# Patient Record
Sex: FEMALE | Race: ASIAN | NOT HISPANIC OR LATINO
[De-identification: names, ages, dates, MRNs, and addresses within clinical notes are randomized per-mention and may not be internally consistent; named-entity substitution may affect disease eponyms.]

---
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
## 2022-06-10 ENCOUNTER — APPOINTMENT (OUTPATIENT)
Dept: INTERNAL MEDICINE | Facility: CLINIC | Age: 22
End: 2022-06-10

## 2022-07-07 ENCOUNTER — APPOINTMENT (OUTPATIENT)
Dept: SURGERY | Facility: CLINIC | Age: 22
End: 2022-07-07

## 2022-07-07 VITALS
DIASTOLIC BLOOD PRESSURE: 73 MMHG | TEMPERATURE: 97.9 F | OXYGEN SATURATION: 99 % | BODY MASS INDEX: 20.49 KG/M2 | SYSTOLIC BLOOD PRESSURE: 109 MMHG | WEIGHT: 120 LBS | HEIGHT: 64 IN | HEART RATE: 60 BPM

## 2022-07-07 DIAGNOSIS — Z78.9 OTHER SPECIFIED HEALTH STATUS: ICD-10-CM

## 2022-07-07 PROCEDURE — 99212 OFFICE O/P EST SF 10 MIN: CPT

## 2022-07-07 NOTE — HISTORY OF PRESENT ILLNESS
[FreeTextEntry1] : s/p excision of right breast mass 7/12/21. 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 Pt is here for a followup exam . She has had a breast sonogram. She denies any new masses, breast pain or nipple discharge.

## 2022-07-07 NOTE — PAST MEDICAL HISTORY
[Menarche Age ____] : age at menarche was [unfilled] [Definite ___ (Date)] : the last menstrual period was [unfilled] [Regular Cycle Intervals] : have been regular [History of Hormone Replacement Treatment] : has no history of hormone replacement treatment [FreeTextEntry5] : N/A [FreeTextEntry6] : N/A [FreeTextEntry7] : N/A [FreeTextEntry8] : N/A

## 2022-07-07 NOTE — PHYSICAL EXAM
[Normocephalic] : normocephalic [Atraumatic] : atraumatic [EOMI] : extra ocular movement intact [PERRL] : pupils equal, round and reactive to light [Sclera nonicteric] : sclera nonicteric [Supple] : supple [No Supraclavicular Adenopathy] : no supraclavicular adenopathy [No Cervical Adenopathy] : no cervical adenopathy [Clear to Auscultation Bilat] : clear to auscultation bilaterally [Normal Sinus Rhythm] : normal sinus rhythm [Examined in the supine and seated position] : examined in the supine and seated position [Symmetrical] : symmetrical [No dominant masses] : no dominant masses in right breast  [No dominant masses] : no dominant masses left breast [No Nipple Retraction] : no left nipple retraction [No Nipple Discharge] : no left nipple discharge [No Axillary Lymphadenopathy] : no left axillary lymphadenopathy [Soft] : abdomen soft [Normal Bowel Sounds] : normal bowel sounds  [No Edema] : no edema [No Rashes] : no rashes [No Ulceration] : no ulceration [de-identified] : well developed white female in no distress

## 2022-07-07 NOTE — ASSESSMENT
[FreeTextEntry1] : Sonogram report of 7/5/22 reviewed, ie multiple breast nodules with one new left breast nodule.\par Plan recommend sonograms every six months for two years till no changes than every year with exams at the same time

## 2022-12-26 ENCOUNTER — NON-APPOINTMENT (OUTPATIENT)
Age: 22
End: 2022-12-26

## 2023-01-05 ENCOUNTER — APPOINTMENT (OUTPATIENT)
Dept: SURGERY | Facility: CLINIC | Age: 23
End: 2023-01-05
Payer: COMMERCIAL

## 2023-01-05 VITALS — TEMPERATURE: 97.1 F

## 2023-01-05 PROCEDURE — 99212 OFFICE O/P EST SF 10 MIN: CPT

## 2023-01-05 NOTE — HISTORY OF PRESENT ILLNESS
[FreeTextEntry1] : 23 yo  Female presents s/p Breast US for follow up evaluation.  Patient denies pain, new masses, nipple inversion or drainage.\par \par Breast US 01/02/2023: Bilateral fluctuating benign appearing nodules, BI-RADS 3, Rec f/u US in 6 mo\par \par Interval Hx: s/p excision of right breast mass 7/12/21. 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 Pt is here for a followup exam . She has had a breast sonogram. She denies any new masses, breast pain or nipple discharge.

## 2023-06-27 ENCOUNTER — APPOINTMENT (OUTPATIENT)
Dept: INTERNAL MEDICINE | Facility: CLINIC | Age: 23
End: 2023-06-27
Payer: COMMERCIAL

## 2023-06-27 VITALS
OXYGEN SATURATION: 99 % | HEART RATE: 73 BPM | DIASTOLIC BLOOD PRESSURE: 80 MMHG | WEIGHT: 127 LBS | SYSTOLIC BLOOD PRESSURE: 122 MMHG

## 2023-06-27 DIAGNOSIS — Z00.00 ENCOUNTER FOR GENERAL ADULT MEDICAL EXAMINATION W/OUT ABNORMAL FINDINGS: ICD-10-CM

## 2023-06-27 PROCEDURE — 99385 PREV VISIT NEW AGE 18-39: CPT | Mod: 25

## 2023-06-27 PROCEDURE — 36415 COLL VENOUS BLD VENIPUNCTURE: CPT

## 2023-06-27 NOTE — PLAN
[FreeTextEntry1] : \par HCM )  follow w/ dental, opthalmology, GYN as recommended\par vaccination up to date as per pt \par healthy and safe lifestyle discussed with pt\par Lab ( venipuncture ) done today at this office\par \par Multiple breast masses, likely benign fibroadenomas, follow up US scheduled in july\par Breast clinic referral given to pt after US done\par \par

## 2023-06-27 NOTE — HISTORY OF PRESENT ILLNESS
[FreeTextEntry1] : new patient evaluation [de-identified] : pt has been doing well without any acute illness or injury\par PSH breast lump removal 2021\par Allergy none\par PMH breast lump ( fibroadenoma Rt )\par

## 2023-06-27 NOTE — PHYSICAL EXAM
[No Acute Distress] : no acute distress [Well Nourished] : well nourished [Well Developed] : well developed [Well-Appearing] : well-appearing [Normal Sclera/Conjunctiva] : normal sclera/conjunctiva [PERRL] : pupils equal round and reactive to light [EOMI] : extraocular movements intact [Normal Outer Ear/Nose] : the outer ears and nose were normal in appearance [No JVD] : no jugular venous distention [No Lymphadenopathy] : no lymphadenopathy [Supple] : supple [Thyroid Normal, No Nodules] : the thyroid was normal and there were no nodules present [No Respiratory Distress] : no respiratory distress  [No Accessory Muscle Use] : no accessory muscle use [Clear to Auscultation] : lungs were clear to auscultation bilaterally [Normal Rate] : normal rate  [Regular Rhythm] : with a regular rhythm [Normal S1, S2] : normal S1 and S2 [No Murmur] : no murmur heard [No Edema] : there was no peripheral edema [No Extremity Clubbing/Cyanosis] : no extremity clubbing/cyanosis [Soft] : abdomen soft [Non Tender] : non-tender [Non-distended] : non-distended [No Masses] : no abdominal mass palpated [Normal Anterior Cervical Nodes] : no anterior cervical lymphadenopathy [Grossly Normal Strength/Tone] : grossly normal strength/tone [Coordination Grossly Intact] : coordination grossly intact [Normal Gait] : normal gait [Normal Affect] : the affect was normal [Normal Insight/Judgement] : insight and judgment were intact [Normal Appearance] : normal in appearance [No Joint Swelling] : no joint swelling [Alert and Oriented x3] : oriented to person, place, and time [de-identified] : 2 mass palpable on Rt breast, one mass on left breast

## 2023-06-27 NOTE — HEALTH RISK ASSESSMENT
[Very Good] : ~his/her~  mood as very good [Yes] : Yes [2 - 4 times a month (2 pts)] : 2-4 times a month (2 points) [1 or 2 (0 pts)] : 1 or 2 (0 points) [No] : In the past 12 months have you used drugs other than those required for medical reasons? No [No falls in past year] : Patient reported no falls in the past year [0] : 2) Feeling down, depressed, or hopeless: Not at all (0) [PHQ-2 Negative - No further assessment needed] : PHQ-2 Negative - No further assessment needed [HIV test declined] : HIV test declined [Hepatitis C test declined] : Hepatitis C test declined [None] : None [With Family] : lives with family [Student] : student [Single] : single [Feels Safe at Home] : Feels safe at home [Fully functional (bathing, dressing, toileting, transferring, walking, feeding)] : Fully functional (bathing, dressing, toileting, transferring, walking, feeding) [Fully functional (using the telephone, shopping, preparing meals, housekeeping, doing laundry, using] : Fully functional and needs no help or supervision to perform IADLs (using the telephone, shopping, preparing meals, housekeeping, doing laundry, using transportation, managing medications and managing finances) [Reports normal functional visual acuity (ie: able to read med bottle)] : Reports normal functional visual acuity [Seat Belt] :  uses seat belt [Never] : Never [de-identified] : yoga, walking  [de-identified] : regular [Change in mental status noted] : No change in mental status noted [Language] : denies difficulty with language [Behavior] : denies difficulty with behavior [Sexually Active] : not sexually active [Reports changes in hearing] : Reports no changes in hearing [Reports changes in vision] : Reports no changes in vision [Reports changes in dental health] : Reports no changes in dental health

## 2023-06-29 ENCOUNTER — NON-APPOINTMENT (OUTPATIENT)
Age: 23
End: 2023-06-29

## 2023-06-29 LAB
ALBUMIN SERPL ELPH-MCNC: 5.1 G/DL
ALP BLD-CCNC: 82 U/L
ALT SERPL-CCNC: 11 U/L
ANION GAP SERPL CALC-SCNC: 16 MMOL/L
AST SERPL-CCNC: 19 U/L
BILIRUB SERPL-MCNC: 0.3 MG/DL
BUN SERPL-MCNC: 9 MG/DL
CALCIUM SERPL-MCNC: 9.9 MG/DL
CHLORIDE SERPL-SCNC: 104 MMOL/L
CHOLEST SERPL-MCNC: 164 MG/DL
CO2 SERPL-SCNC: 21 MMOL/L
CREAT SERPL-MCNC: 0.58 MG/DL
EGFR: 132 ML/MIN/1.73M2
GLUCOSE SERPL-MCNC: 100 MG/DL
HCT VFR BLD CALC: 42.9 %
HDLC SERPL-MCNC: 30 MG/DL
HGB BLD-MCNC: 13.2 G/DL
LDLC SERPL CALC-MCNC: 54 MG/DL
MCHC RBC-ENTMCNC: 27.6 PG
MCHC RBC-ENTMCNC: 30.8 GM/DL
MCV RBC AUTO: 89.6 FL
NONHDLC SERPL-MCNC: 134 MG/DL
PLATELET # BLD AUTO: 349 K/UL
POTASSIUM SERPL-SCNC: 4.2 MMOL/L
PROT SERPL-MCNC: 8.3 G/DL
RBC # BLD: 4.79 M/UL
RBC # FLD: 12.4 %
SODIUM SERPL-SCNC: 141 MMOL/L
TRIGL SERPL-MCNC: 400 MG/DL
TSH SERPL-ACNC: 2.99 UIU/ML
WBC # FLD AUTO: 6.25 K/UL

## 2023-07-06 ENCOUNTER — APPOINTMENT (OUTPATIENT)
Dept: SURGERY | Facility: CLINIC | Age: 23
End: 2023-07-06
Payer: COMMERCIAL

## 2023-07-06 PROCEDURE — 99212 OFFICE O/P EST SF 10 MIN: CPT

## 2023-07-06 NOTE — HISTORY OF PRESENT ILLNESS
[FreeTextEntry1] : s/p excision of right breast mass 7/12/21. 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 Pt is here for a followup exam . She has had a breast sonogram. She denies any new masses, breast pain or nipple discharge.\par 7/5/23 followup exam. Pt had a repeat sonogram that showed three of the right breast lesions were bigger in size.

## 2023-07-06 NOTE — PHYSICAL EXAM
[Normocephalic] : normocephalic [Atraumatic] : atraumatic [EOMI] : extra ocular movement intact [PERRL] : pupils equal, round and reactive to light [Sclera nonicteric] : sclera nonicteric [Supple] : supple [No Supraclavicular Adenopathy] : no supraclavicular adenopathy [No Cervical Adenopathy] : no cervical adenopathy [Clear to Auscultation Bilat] : clear to auscultation bilaterally [Normal Sinus Rhythm] : normal sinus rhythm [Examined in the supine and seated position] : examined in the supine and seated position [Symmetrical] : symmetrical [No dominant masses] : no dominant masses left breast [No Nipple Retraction] : no left nipple retraction [No Nipple Discharge] : no left nipple discharge [No Axillary Lymphadenopathy] : no left axillary lymphadenopathy [de-identified] : well developed female in no acute distress

## 2023-07-06 NOTE — ASSESSMENT
[FreeTextEntry1] : I have discussed with pt and mother that if any of these masses continues to grow that they should be removed.\par PLAN \par    repeat sonogram and exam in six months

## 2023-11-21 ENCOUNTER — APPOINTMENT (OUTPATIENT)
Dept: INTERNAL MEDICINE | Facility: CLINIC | Age: 23
End: 2023-11-21

## 2023-11-23 ENCOUNTER — NON-APPOINTMENT (OUTPATIENT)
Age: 23
End: 2023-11-23

## 2023-12-28 ENCOUNTER — APPOINTMENT (OUTPATIENT)
Dept: INTERNAL MEDICINE | Facility: CLINIC | Age: 23
End: 2023-12-28
Payer: COMMERCIAL

## 2023-12-28 VITALS
DIASTOLIC BLOOD PRESSURE: 66 MMHG | WEIGHT: 125 LBS | BODY MASS INDEX: 21.34 KG/M2 | OXYGEN SATURATION: 98 % | HEIGHT: 64 IN | SYSTOLIC BLOOD PRESSURE: 104 MMHG | HEART RATE: 86 BPM

## 2023-12-28 DIAGNOSIS — R73.01 IMPAIRED FASTING GLUCOSE: ICD-10-CM

## 2023-12-28 DIAGNOSIS — E78.1 PURE HYPERGLYCERIDEMIA: ICD-10-CM

## 2023-12-28 PROCEDURE — 99213 OFFICE O/P EST LOW 20 MIN: CPT | Mod: 25

## 2023-12-28 PROCEDURE — 36415 COLL VENOUS BLD VENIPUNCTURE: CPT

## 2023-12-28 NOTE — HISTORY OF PRESENT ILLNESS
[FreeTextEntry1] : hyperlipidemia [de-identified] : pt found to have high TG, started fish oil supplement. pt asymptomatic pt to follow with Dr Messina in 1/2024

## 2023-12-28 NOTE — PLAN
[FreeTextEntry1] :  repeat fasting lipid today consider starting vascepa if persistently elevated TG on OTC fish oil check Hb A1c R/O preDM ( last fasting glucose 100 )

## 2023-12-28 NOTE — PHYSICAL EXAM
[No Acute Distress] : no acute distress [Well-Appearing] : well-appearing [Supple] : supple [No Respiratory Distress] : no respiratory distress  [No Accessory Muscle Use] : no accessory muscle use [Clear to Auscultation] : lungs were clear to auscultation bilaterally [Normal Rate] : normal rate  [Regular Rhythm] : with a regular rhythm [Normal S1, S2] : normal S1 and S2 [No Edema] : there was no peripheral edema [No Extremity Clubbing/Cyanosis] : no extremity clubbing/cyanosis [Grossly Normal Strength/Tone] : grossly normal strength/tone [Normal Gait] : normal gait [Normal Affect] : the affect was normal [Alert and Oriented x3] : oriented to person, place, and time 2 = difficulty swallowing foods

## 2024-01-02 LAB
CHOLEST SERPL-MCNC: 141 MG/DL
ESTIMATED AVERAGE GLUCOSE: 105 MG/DL
HBA1C MFR BLD HPLC: 5.3 %
HDLC SERPL-MCNC: 24 MG/DL
LDLC SERPL CALC-MCNC: 66 MG/DL
NONHDLC SERPL-MCNC: 117 MG/DL
TRIGL SERPL-MCNC: 322 MG/DL

## 2024-01-03 ENCOUNTER — APPOINTMENT (OUTPATIENT)
Dept: SURGICAL ONCOLOGY | Facility: CLINIC | Age: 24
End: 2024-01-03
Payer: COMMERCIAL

## 2024-01-03 ENCOUNTER — NON-APPOINTMENT (OUTPATIENT)
Age: 24
End: 2024-01-03

## 2024-01-03 VITALS
BODY MASS INDEX: 21.34 KG/M2 | DIASTOLIC BLOOD PRESSURE: 81 MMHG | WEIGHT: 125 LBS | SYSTOLIC BLOOD PRESSURE: 115 MMHG | HEART RATE: 64 BPM | HEIGHT: 64 IN | OXYGEN SATURATION: 99 % | RESPIRATION RATE: 17 BRPM

## 2024-01-03 DIAGNOSIS — Z80.0 FAMILY HISTORY OF MALIGNANT NEOPLASM OF DIGESTIVE ORGANS: ICD-10-CM

## 2024-01-03 DIAGNOSIS — Z80.8 FAMILY HISTORY OF MALIGNANT NEOPLASM OF OTHER ORGANS OR SYSTEMS: ICD-10-CM

## 2024-01-03 PROCEDURE — 99204 OFFICE O/P NEW MOD 45 MIN: CPT

## 2024-01-03 PROCEDURE — 99214 OFFICE O/P EST MOD 30 MIN: CPT

## 2024-01-03 NOTE — PHYSICAL EXAM
[Normocephalic] : normocephalic [Atraumatic] : atraumatic [EOMI] : extra ocular movement intact [PERRL] : pupils equal, round and reactive to light [Sclera nonicteric] : sclera nonicteric [Supple] : supple [No Supraclavicular Adenopathy] : no supraclavicular adenopathy [No Cervical Adenopathy] : no cervical adenopathy [Examined in the supine and seated position] : examined in the supine and seated position [Bra Size: ___] : Bra Size: [unfilled] [No dominant masses] : no dominant masses in right breast  [No dominant masses] : no dominant masses left breast [No Nipple Retraction] : no left nipple retraction [No Nipple Discharge] : no left nipple discharge [No Axillary Lymphadenopathy] : no left axillary lymphadenopathy [No Edema] : no edema [No Rashes] : no rashes [No Ulceration] : no ulceration [de-identified] : non-labored respirations

## 2024-01-03 NOTE — PAST MEDICAL HISTORY
[Menstruating] : The patient is menstruating [Menarche Age ____] : age at menarche was [unfilled] [History of Hormone Replacement Treatment] : has no history of hormone replacement treatment [Approximately ___] : the LMP was approximately [unfilled] [Regular Cycle Intervals] : have been regular [Total Preg ___] : G[unfilled] [FreeTextEntry6] : None [FreeTextEntry7] : 1 month

## 2024-01-03 NOTE — DATA REVIEWED
[FreeTextEntry1] : 5/20/2021 B/L Baseline DM 5/20/2021 B/L US 7/12/2023 R excisional biopsy 7/5/2022 B/L US 1/2/2023 B/L US 7/1/2023 B/L US

## 2024-01-03 NOTE — HISTORY OF PRESENT ILLNESS
[FreeTextEntry1] : This is a 23 year-old F referred by Dr. Audi Dalal for evaluation of BIRADS 3 breast imaging/bilateral breast nodules, here for an initial consultation.  She is accompanied by her mother Shade Bush.  The patient reports that she noticed bilateral breast masses about 5 years ago.  She had a larger mass on the right than the left.  She is unsure when she started breast imaging, but the right breast mass was shown to increase, and she finally went for excision in  where it was found to be a fibroadenoma versus benign phyllodes.  Since then, she has noticed other breast masses in the right side, one of them also now growing.  She still feels the left-sided mass, but does not think has gotten any bigger.  Recent imagin2021 B/L Baseline DM ([facility]) revealed extremely dense breasts -R upper central 4.7 cm nodule (palp) with sono correlate, possible fibroadenoma--> f/u US in 6 months -L neg -BR3  2021 B/L US ([facility]) -R 12:00 N3 (palp) 4.6 cm hypoechoic nodule--> f/u R US in 6 months -R 2:00 N2 1.2 cm hypoechoic nodule -R 6:00 N4 1.2 cm hypoechoic nodule -L 5:00 N5 1.2 cm hypoechoic nodule (palp)--> f/u L US in 6 months -L 4:00 N4 6 mm hypoechoic nodule -L 9:00 N6 1.8 cm hypoechoic nodule -L11:00 N5 1.1 cm hypoechoic nodule -L axillary LN 2.7 cm with cortical thickening (likely reactive 2/2 Covid vaccine)--> f/u US in 6 months -L axillary LN 1 cm (likely reactive 2/2 Covid vaccine)--> f/u US in 6 months -BR3  2023 R excisional biopsy (mass 12:00) Dr. Sincere Rice -R 12:00: benign fibroepithelial lesion, favor fibroadenoma (focal changes suggestive of benign phyllodes tumor).  Slide review at [facility] notes that overall findings are that of fibroadenoma.  2022 B/L US ([facility]) -R 2:00 N2 1.2 cm mass, unchanged -R 6:00 N4 1.1 cm mass, unchanged -L 1:00 N2 1.6 cm mass, new -L 4:00 N4 9 mm mass minimally increased in size -L 5:00 N5 1.2 cm mass, unchanged -L 9:00 N6 1.7 cm mass, unchanged -L 11:00 N5 1.2 cm mass, unchanged -L axillary LNs within normal limits -BR3--> f/u b/l US in 6 months to re assess all findings  2023 B/L US ([facility]) -R 1:00 N9 1.6 cm nodule, new -R 2:00 N2 9 mm nodule, slightly smaller -R 6:00 N4 8 mm nodule, decreased -R 7:00 N8 1.1 cm nodule, new -R 9:00 N8 9 mm nodule, new -R 11:00 N4 2 cm nodule, new -L 1:00 N2 1.7 cm nodule, stable -L 4:00 N4 1.2 cm nodule, slightly smaller -L 5:00 N5 1,9 cm nodule, slightly larger -L 9:00 N6 1.2 cm nodule, decreased -L 11:00 N5 1.1 cm nodule, stable -BR3--> f/u b/l US in 6 months to reassess all findings  2023 B/L US ([facility]) -R 1:00 N9 1.9 cm nodule, increased -R 2:00 N2 9 mm nodule, stable -R 6:00 N4 1.3 cm nodule, increased -R 7:00 N8 1.1 cm nodule, stable -R 9:00 N8 8 mm nodule, stable -R 11:00 N4 2.5 cm nodule, increased -L 1:00 N2, 1.5 cm nodule, stable -L 4:00 N4, 1.3 cm nodule, stable -L 5:00 N5 1.6 cm nodule, stable -L 9:00 N6 1.4 cm nodule, stable -L 11:00 N5 1 cm nodule, stable -BR3--> f/u b/l US in 6 months to reassess all findings  2023 B/L US -R 1:00 N9 2.4 cm hypoechoic nodule, increased -R 2:00 N2 9 mm hypoechoic nodule, stable -R 7:00 N8 1.2 cm hypoechoic nodule, stable -R 6:00 N41.3 Centimeter hypoechoic nodule, stable -R 9:00 N8 8 mm hypoechoic nodule, stable -R 11:00 N4 3.0 cm hypoechoic nodule, increased in size -L 1:00 N2 1.7 cm hypoechoic nodule, stable -L 4:00 N4 1.3 cm hypoechoic nodule, stable -L 5:00 N5 1.6 cm hypoechoic nodule, stable -L 9:00 N6 1.5 Centimeter hypoechoic nodule, stable -L 11:00 N5 1.2 Centimeter hypoechoic nodule, stable -BR3  PMH: HLD PSH: Right breast excisional biopsy  Meds: Fish oil ALL: Denies SH: No tobacco.  3-4 EtOH/month FH: Paternal aunt breast cancer age 50.  Paternal grandmother colon cancer age 60s.  Paternal uncle rectal cancer age 50s.  Paternal aunt thyroid cancer age 50s. GYN: Menarche 15.  LMP approximately 1 month ago, regular.  G0, P0.  OCP x 1 month, not currently.  Fertility none.  HRT none.

## 2024-01-03 NOTE — ASSESSMENT
[FreeTextEntry1] : This is a 23 year-old F referred by Dr. Audi Dalal for evaluation of BIRADS 3 breast imaging/bilateral breast nodules, here for an initial consultation.  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  Fibroadenomas are the most common benign tumor in the breast, accounting for one-half of all breast biopsies We discussed indications for observation vs. excision of fibroadenomas, and the role of the "triple test": if a breast mass is benign on radiologic appearance, clinical exam and needle biopsy, it is safe to observe these lesions with repeat imaging q6 months x2 years--if the lesion remains unchanged or regresses, at that time the patient can be safely returned to routine breast care. If the lesion grows or changes in configuration, excision is recommended, as it also is for cases where there is discordance between these three characteristics.   We discussed that fibroadenomas are not signs of unusual breast activity or breast cancer risk.  However, given the large size, a small proportion of fibroadenomas on core biopsy are upgraded to a phyllodes tumor on excision. Phyllodes tumors are uncommon fibroepithelial breast tumors that are further classified as benign, borderline, or malignant.  Benign and borderline phyllodes tumors rarely recur following wide excision. Malignant phyllodes tumors can metastasize distantly.   One cm margins for borderline or malignant phyllodes tumors are preferable, and narrower margins are acceptable for benign phyllodes tumors.   Preoperative, intraoperative, and postoperative considerations, intraoperative anesthetic management, and what she can expect in postoperative period were reviewed. Risks, benefits, and alternatives to proposed surgical procedure were reviewed and all questions were answered to her satisfaction.     PLAN: -Right breast excisional biopsy x 2 (palpable) -Will continue to follow other breast masses B/L US 6/2024 -Pt is going back to college in 2 weeks--can try to plan for surgery now but if not able to schedule, will have to delay until she returns in May. This is probably ok given BR3 lesions. Will reimage prior to surgery if scheduled in May

## 2024-01-03 NOTE — CONSULT LETTER
[Dear  ___] : Dear  [unfilled], [Courtesy Letter:] : I had the pleasure of seeing your patient, [unfilled], in my office today. [Please see my note below.] : Please see my note below. [Consult Closing:] : Thank you very much for allowing me to participate in the care of this patient.  If you have any questions, please do not hesitate to contact me. [Sincerely,] : Sincerely, [Consult Letter:] : I had the pleasure of evaluating your patient, [unfilled]. [FreeTextEntry2] : Audi Dalal MD [FreeTextEntry3] : Katie Messina MD Breast Surgeon Division of Surgical Oncology Department of Surgery [address] [place] [address] Tel: [phone] Fax: [phone] Email: [email or web address]

## 2024-01-04 ENCOUNTER — APPOINTMENT (OUTPATIENT)
Dept: SURGERY | Facility: CLINIC | Age: 24
End: 2024-01-04

## 2024-01-05 ENCOUNTER — OUTPATIENT (OUTPATIENT)
Dept: OUTPATIENT SERVICES | Facility: HOSPITAL | Age: 24
LOS: 1 days | End: 2024-01-05

## 2024-01-05 VITALS
WEIGHT: 126.1 LBS | HEIGHT: 63.75 IN | HEART RATE: 68 BPM | TEMPERATURE: 98 F | SYSTOLIC BLOOD PRESSURE: 119 MMHG | RESPIRATION RATE: 16 BRPM | DIASTOLIC BLOOD PRESSURE: 84 MMHG | OXYGEN SATURATION: 98 %

## 2024-01-05 DIAGNOSIS — N63.10 UNSPECIFIED LUMP IN THE RIGHT BREAST, UNSPECIFIED QUADRANT: ICD-10-CM

## 2024-01-05 DIAGNOSIS — Z98.890 OTHER SPECIFIED POSTPROCEDURAL STATES: Chronic | ICD-10-CM

## 2024-01-05 DIAGNOSIS — N60.09 SOLITARY CYST OF UNSPECIFIED BREAST: ICD-10-CM

## 2024-01-05 LAB
HCG SERPL-ACNC: <1 MIU/ML — SIGNIFICANT CHANGE UP
HCG SERPL-ACNC: <1 MIU/ML — SIGNIFICANT CHANGE UP
HCT VFR BLD CALC: 38.6 % — SIGNIFICANT CHANGE UP (ref 34.5–45)
HCT VFR BLD CALC: 38.6 % — SIGNIFICANT CHANGE UP (ref 34.5–45)
HGB BLD-MCNC: 12.6 G/DL — SIGNIFICANT CHANGE UP (ref 11.5–15.5)
HGB BLD-MCNC: 12.6 G/DL — SIGNIFICANT CHANGE UP (ref 11.5–15.5)
MCHC RBC-ENTMCNC: 27.3 PG — SIGNIFICANT CHANGE UP (ref 27–34)
MCHC RBC-ENTMCNC: 27.3 PG — SIGNIFICANT CHANGE UP (ref 27–34)
MCHC RBC-ENTMCNC: 32.6 GM/DL — SIGNIFICANT CHANGE UP (ref 32–36)
MCHC RBC-ENTMCNC: 32.6 GM/DL — SIGNIFICANT CHANGE UP (ref 32–36)
MCV RBC AUTO: 83.7 FL — SIGNIFICANT CHANGE UP (ref 80–100)
MCV RBC AUTO: 83.7 FL — SIGNIFICANT CHANGE UP (ref 80–100)
NRBC # BLD: 0 /100 WBCS — SIGNIFICANT CHANGE UP (ref 0–0)
NRBC # BLD: 0 /100 WBCS — SIGNIFICANT CHANGE UP (ref 0–0)
NRBC # FLD: 0 K/UL — SIGNIFICANT CHANGE UP (ref 0–0)
NRBC # FLD: 0 K/UL — SIGNIFICANT CHANGE UP (ref 0–0)
PLATELET # BLD AUTO: 349 K/UL — SIGNIFICANT CHANGE UP (ref 150–400)
PLATELET # BLD AUTO: 349 K/UL — SIGNIFICANT CHANGE UP (ref 150–400)
RBC # BLD: 4.61 M/UL — SIGNIFICANT CHANGE UP (ref 3.8–5.2)
RBC # BLD: 4.61 M/UL — SIGNIFICANT CHANGE UP (ref 3.8–5.2)
RBC # FLD: 12.3 % — SIGNIFICANT CHANGE UP (ref 10.3–14.5)
RBC # FLD: 12.3 % — SIGNIFICANT CHANGE UP (ref 10.3–14.5)
WBC # BLD: 5.91 K/UL — SIGNIFICANT CHANGE UP (ref 3.8–10.5)
WBC # BLD: 5.91 K/UL — SIGNIFICANT CHANGE UP (ref 3.8–10.5)
WBC # FLD AUTO: 5.91 K/UL — SIGNIFICANT CHANGE UP (ref 3.8–10.5)
WBC # FLD AUTO: 5.91 K/UL — SIGNIFICANT CHANGE UP (ref 3.8–10.5)

## 2024-01-05 NOTE — H&P PST ADULT - HISTORY OF PRESENT ILLNESS
22 y/o healthy female with c/o of right breast lump x2 scheduled for right breast excisional biopsy x 2 (palpable) with Dr. Messina  24 y/o healthy female with c/o of right breast lump x2 scheduled for right breast excisional biopsy x 2 (palpable) with Dr. Messina  22 y/o healthy female with c/o of right breast lump x2 scheduled for right breast excisional biopsy x 2 (palpable) with Dr. Shah

## 2024-01-05 NOTE — H&P PST ADULT - ASSESSMENT
22 y/o healthy female with c/o of right breast lump x2 scheduled for right breast excisional biopsy x 2 (palpable) with Dr. Shah 24 y/o healthy female with c/o of right breast lump x2 scheduled for right breast excisional biopsy x 2 (palpable) with Dr. Shah

## 2024-01-05 NOTE — H&P PST ADULT - NEGATIVE CARDIOVASCULAR SYMPTOMS
no chest pain/no dyspnea on exertion/no peripheral edema no chest pain/no palpitations/no dyspnea on exertion/no peripheral edema

## 2024-01-05 NOTE — H&P PST ADULT - PROBLEM SELECTOR PLAN 1
-Scheduled for right breast excisional biopsy x 2 (palpable) with Dr. Messina on 1/11/2024.  -Verbal and written pre-op instructions provided to patient. Patient verbalized understanding and will call surgeons office for revised instructions if surgery is rescheduled.   -Pepcid for GI prophylaxis provided.   -patient given verbal and written instruction with teach back on chlorhexidine shampoo, and the patient verbalized understanding with return demonstration.   Urine pregnancy test DOS-Urine cup provided.

## 2024-01-10 ENCOUNTER — TRANSCRIPTION ENCOUNTER (OUTPATIENT)
Age: 24
End: 2024-01-10

## 2024-01-11 ENCOUNTER — TRANSCRIPTION ENCOUNTER (OUTPATIENT)
Age: 24
End: 2024-01-11

## 2024-01-11 ENCOUNTER — OUTPATIENT (OUTPATIENT)
Dept: OUTPATIENT SERVICES | Facility: HOSPITAL | Age: 24
LOS: 1 days | Discharge: HOME | End: 2024-01-11
Payer: COMMERCIAL

## 2024-01-11 ENCOUNTER — RESULT REVIEW (OUTPATIENT)
Age: 24
End: 2024-01-11

## 2024-01-11 ENCOUNTER — APPOINTMENT (OUTPATIENT)
Dept: SURGICAL ONCOLOGY | Facility: AMBULATORY SURGERY CENTER | Age: 24
End: 2024-01-11

## 2024-01-11 VITALS
DIASTOLIC BLOOD PRESSURE: 89 MMHG | SYSTOLIC BLOOD PRESSURE: 124 MMHG | HEIGHT: 63.75 IN | WEIGHT: 126.1 LBS | HEART RATE: 63 BPM | OXYGEN SATURATION: 98 % | TEMPERATURE: 97 F | RESPIRATION RATE: 20 BRPM

## 2024-01-11 VITALS
HEART RATE: 72 BPM | OXYGEN SATURATION: 97 % | RESPIRATION RATE: 18 BRPM | DIASTOLIC BLOOD PRESSURE: 75 MMHG | SYSTOLIC BLOOD PRESSURE: 118 MMHG

## 2024-01-11 DIAGNOSIS — N63.10 UNSPECIFIED LUMP IN THE RIGHT BREAST, UNSPECIFIED QUADRANT: ICD-10-CM

## 2024-01-11 DIAGNOSIS — Z98.890 OTHER SPECIFIED POSTPROCEDURAL STATES: Chronic | ICD-10-CM

## 2024-01-11 PROCEDURE — 88305 TISSUE EXAM BY PATHOLOGIST: CPT | Mod: 26

## 2024-01-11 PROCEDURE — 19120 REMOVAL OF BREAST LESION: CPT | Mod: RT

## 2024-01-11 RX ORDER — OMEGA-3 ACID ETHYL ESTERS 1 G
1 CAPSULE ORAL
Refills: 0 | DISCHARGE

## 2024-01-11 RX ORDER — IBUPROFEN 200 MG
1 TABLET ORAL
Qty: 0 | Refills: 0 | DISCHARGE

## 2024-01-11 RX ORDER — ACETAMINOPHEN 500 MG
1 TABLET ORAL
Qty: 0 | Refills: 0 | DISCHARGE

## 2024-01-11 NOTE — ASU DISCHARGE PLAN (ADULT/PEDIATRIC) - ASU DC SPECIAL INSTRUCTIONSFT
Pre-printed instructions at bedside. Call Dr. Messina's office to schedule a follow up appointment for 7-10 days from now (can be virtual). Take tylenol and motrin each every 6 hours alternating in a staggered way so you have either tylenol or motrin every 3 hours. Wear your own sports bra as much as is comfortable. Use ice packs for swelling and pain control. Please see printed bedside instructions.
Self

## 2024-01-11 NOTE — ASU DISCHARGE PLAN (ADULT/PEDIATRIC) - PROVIDER TOKENS
PROVIDER:[TOKEN:[[id number]],FOLLOWUP:[1 week]] PROVIDER:[TOKEN:[[id number]],FOLLOWUP:[1 week]]

## 2024-01-11 NOTE — ASU PREOPERATIVE ASSESSMENT, ADULT ([facility] ONLY) - FALL HARM RISK - UNIVERSAL INTERVENTIONS
Bed in lowest position, wheels locked, appropriate side rails in place/Call bell, personal items and telephone in reach/Instruct patient to call for assistance before getting out of bed or chair/Non-slip footwear when patient is out of bed/[place] to call system/Physically safe environment - no spills, clutter or unnecessary equipment/Purposeful Proactive Rounding/Room/bathroom lighting operational, light cord in reach Bed in lowest position, wheels locked, appropriate side rails in place/Call bell, personal items and telephone in reach/Instruct patient to call for assistance before getting out of bed or chair/Non-slip footwear when patient is out of bed/[place] to call system/Physically safe environment - no spills, clutter or unnecessary equipment/Purposeful Proactive Rounding/Room/bathroom lighting operational, light cord in reach

## 2024-01-11 NOTE — ASU DISCHARGE PLAN (ADULT/PEDIATRIC) - NS MD DC FALL RISK RISK
For information on Fall & Injury Prevention, visit: [email or web address] OR  [email or web address] OR  https://www.cdc.gov/steadi/patient.html For information on Fall & Injury Prevention, visit: [email or web address]://www.[facility]/news/fall-[email or web address]-[email or web address]-[email or web address]-mobility OR  https://www.[facility]/news/fall-[email or web address]-[email or web address]-to-avoid-injury OR  https://www.cdc.gov/steadi/patient.html

## 2024-01-11 NOTE — ASU DISCHARGE PLAN (ADULT/PEDIATRIC) - CARE PROVIDER_API CALL
Katie Messina)  Surgery  [address] [place] [id number]  Phone: [phone]  Fax: [phone]  Follow Up Time: 1 week   Katie Messina)  Surgery  [address] [place] [id number]  Phone: [phone]  Fax: [phone]  Follow Up Time: 1 week

## 2024-01-22 LAB — SURGICAL PATHOLOGY STUDY: SIGNIFICANT CHANGE UP

## 2024-02-12 NOTE — ASU DISCHARGE PLAN (ADULT/PEDIATRIC) - HISTORY OF COVID-19 VACCINATION
From: Janeth Fenton  To: Sidra Borges  Sent: 2/12/2024 1:56 PM CST  Subject: Illness     Janeth woke up with 103 fever. She is congested, sore throat, headache, achy, and no appetite. I took her for swabs just to make sure it wasn't covid or influenza. Considering she is still on Cipro, do you think we have anything else she should be checked for? It doesn't seem like strep.    Also, could she get a note for school for today and tomorrow please? I can't send her back until she is fever free for 24 hours so she isn't contagious. She has slept all day aside from our trip to [place]. I just don't want to take her out to contaminate the world if we don't have to.     Thank you!   Yes

## 2024-05-23 PROBLEM — N60.09 SOLITARY CYST OF UNSPECIFIED BREAST: Chronic | Status: ACTIVE | Noted: 2024-01-05

## 2024-07-01 PROBLEM — N63.10 BREAST MASS, RIGHT: Status: ACTIVE | Noted: 2021-07-20

## 2024-07-01 PROBLEM — N63.10 MASSES OF BOTH BREASTS: Status: ACTIVE | Noted: 2021-05-25

## 2024-07-03 ENCOUNTER — APPOINTMENT (OUTPATIENT)
Dept: SURGICAL ONCOLOGY | Facility: CLINIC | Age: 24
End: 2024-07-03
Payer: COMMERCIAL

## 2024-07-03 VITALS
RESPIRATION RATE: 17 BRPM | SYSTOLIC BLOOD PRESSURE: 118 MMHG | BODY MASS INDEX: 21.34 KG/M2 | OXYGEN SATURATION: 99 % | HEIGHT: 64 IN | HEART RATE: 61 BPM | DIASTOLIC BLOOD PRESSURE: 77 MMHG | WEIGHT: 125 LBS

## 2024-07-03 DIAGNOSIS — N63.20 UNSPECIFIED LUMP IN THE LEFT BREAST, UNSPECIFIED QUADRANT: ICD-10-CM

## 2024-07-03 DIAGNOSIS — N63.10 UNSPECIFIED LUMP IN THE RIGHT BREAST, UNSPECIFIED QUADRANT: ICD-10-CM

## 2024-07-03 DIAGNOSIS — N63.20 UNSPECIFIED LUMP IN THE RIGHT BREAST, UNSPECIFIED QUADRANT: ICD-10-CM

## 2024-07-03 PROCEDURE — 99214 OFFICE O/P EST MOD 30 MIN: CPT

## 2024-07-16 NOTE — PHYSICAL EXAM
[facility] Hospitalist Progress Note      SUBJECTIVE       Patient seen and examined on the past 1 month open loss of appetite on and off confused  Meant to assisted awaiting place   REVIEW OF SYSTEMS     Negative for all 10 systems except as per subjective    OBJECTIVE   Awake pleasant    I/O'S       Intake/Output Summary (Last 24 hours) at 7/16/2024 0925  Last data filed at 7/16/2024 0400  Gross per 24 hour   Intake 200 ml   Output 700 ml   Net -500 ml       ALLERGIES:  [facility] MEDS     Current Facility-Administered Medications   Medication Dose Route Frequency Provider Last Rate Last Admin    sertraline (ZOLOFT) tablet 50 mg  50 mg Oral Daily Yasemin De Anda DO   50 mg at 07/16/24 0809    enoxaparin (LOVENOX) injection 40 mg  40 mg Subcutaneous Daily Yasemin De Anda DO   40 mg at 07/14/24 0839    lidocaine (LIDOCARE) 4 % patch 1 patch  1 patch Transdermal Daily Yasemin De Anda DO   1 patch at 07/16/24 0809    pantoprazole (PROTONIX) 40 MG/20ML (compounded) suspension 40 mg  40 mg Oral QAM AC Suze Aguilera MD   40 mg at 07/16/24 0537    Potassium Standard Replacement Protocol (Levels 3.5 and lower)   Does not apply See Admin Instructions Jameson Solorzano MD        Potassium Replacement (Levels 3.6 - 4)   Does not apply See Admin Instructions Jameson Solorzano MD        Magnesium Standard Replacement Protocol   Does not apply See Admin Instructions Jameson Solorzano MD        Phosphorus Standard Replacement Protocol   Does not apply See Admin Instructions Jameson Solorzano MD        levothyroxine (SYNTHROID, LEVOTHROID) tablet 100 mcg  100 mcg Oral QAM AC Jameson Solorzano MD   100 mcg at 07/16/24 0537    QUEtiapine (SEROquel) tablet 25 mg  25 mg Oral 2 times per day Suze Aguilera MD   25 mg at 07/16/24 0809    losartan (COZAAR) tablet 25 mg  25 mg Oral Daily Suze Aguilera MD   25 mg at 07/16/24 0809    tamsulosin (FLOMAX) capsule 0.4 mg  0.4 mg Oral Daily PC Ale  MD Suze   0.4 mg at 07/16/24 0809       PRN medications  Current Facility-Administered Medications   Medication Dose Route Frequency Provider Last Rate Last Admin    traMADol (ULTRAM) tablet 25 mg  25 mg Oral Nightly PRN Yasemin De Anda DO   25 mg at 07/16/24 0149    cyclobenzaprine (FLEXERIL) tablet 5 mg  5 mg Oral BID PRN Yasemin De Anda DO   5 mg at 07/13/24 1659    melatonin tablet 6 mg  6 mg Oral Nightly PRN Yasemin De Anda DO   6 mg at 07/15/24 2112    acetaminophen (TYLENOL) tablet 650 mg  650 mg Oral Q4H PRN Jameson Solorzano MD   650 mg at 07/16/24 0537    polyethylene glycol (MIRALAX) packet 17 g  17 g Oral Daily PRN Jameson Solorzano MD   17 g at 07/07/24 0550    docusate sodium-sennosides (SENOKOT S) 50-8.6 MG 2 tablet  2 tablet Oral Daily PRN Jameson Solorzano MD   2 tablet at 07/15/24 1409    haloperidol lactate (HALDOL) 5 MG/ML short-acting injection 2 mg  2 mg Intravenous Q6H PRN Suze Aguilera MD   2 mg at 06/30/24 2252    hydrALAZINE (APRESOLINE) injection 10 mg  10 mg Intravenous Q8H PRN Suze Aguilera MD   10 mg at 06/29/24 1551       Last Recorded Vitals    Vitals with min/max:  Vital Last Value 24 Hour Range   Temperature 98.2 °F (36.8 °C) (07/16/24 0434) Temp  Min: 98.2 °F (36.8 °C)  Max: 98.4 °F (36.9 °C)   Pulse 64 (07/16/24 0808) Pulse  Min: 62  Max: 68   Respiratory 14 (07/16/24 0249) Resp  Min: 14  Max: 16   Non-Invasive  Blood Pressure 121/58 (07/16/24 0808) BP  Min: 102/60  Max: 121/58   Pulse Oximetry 98 % (07/16/24 0808) SpO2  Min: 96 %  Max: 98 %   Arterial   Blood Pressure   No data recorded       PHYSICAL EXAM       Physical Exam  Vitals reviewed.   Constitutional:       Appearance: Normal appearance.   HENT:      Head: Normocephalic.      Right Ear: Tympanic membrane normal.      Nose: Nose normal.      Mouth/Throat:      Mouth: Mucous membranes are moist.      Neck: Normal range of motion.   Eyes:      Pupils: Pupils are equal, round, and reactive to  [Supple] : supple [No Supraclavicular Adenopathy] : no supraclavicular adenopathy light.   Cardiovascular:      Rate and Rhythm: Normal rate.      Pulses: Normal pulses.   Pulmonary:      Effort: Pulmonary effort is normal.   Abdominal:      General: Abdomen is flat. Bowel sounds are normal.      Palpations: Abdomen is soft.   Genitourinary:     Rectum: Normal.   Musculoskeletal:         General: Normal range of motion.   Skin:     General: Skin is warm.   Neurological:      General: No focal deficit present.      Mental Status: She is alert.   Psychiatric:         Mood and Affect: Mood normal.         Cognition and Memory: Cognition is impaired. Memory is impaired.           LABS       No results found    Recent Labs   Lab 07/15/24  0707 07/14/24  0439   SODIUM 138 135   POTASSIUM 4.1 3.3*   CHLORIDE 105 103   CO2 28 28   BUN 17 15   CREATININE 0.87 0.80   GLUCOSE 93 93   ALBUMIN  --  3.8   AST  --  18   BILIRUBIN  --  0.5       INR  No results for input(s): \"INR\" in the last 8765 hours.    IMAGING     No results found.    CULTURES       Microbiology Results       None               ASSESSMENT/PLAN   All the following conditions PRESENT ON ADMISSION.     Problem List   Principal Problem:    Acute cystitis without hematuria  Active Problems:    Sensorineural hearing loss (SNHL)    Other specified hypothyroidism    Primary hypertension    Acute renal insufficiency    Hypokalemia    Failure to thrive in adult     Urinary tract infection, resolved  - cultures: Klebsiella  - s/p Rocephin, Keflex course     Advanced dementia  Acute metabolic encephalopathy  Agitation, improved  -Initiated on Seroquel 25 mg every 12 hours, improved  -Mental status approximately at baseline, quite forgetful  PLAN  Insurance/Peer to peer denied for JOAN, will work on NH placement, awaiting NH placement  Continue Seroquel as above  Delirium precautions     Major depressive disorder, active episode  -Crying, tearful on exam: Endorses significant depression at this time  -Was initiated on Zoloft earlier this  [Clear to Auscultation Bilat] : clear to auscultation bilaterally admission  PLAN  7/12: Increase Zoloft to 50 mg daily; appears to be having some positive effect  Severe lack of insight, ability to engage in conversation severely limits CBT/cognitive intervention     Abdominal pain  LLQ subcutaneous nodule  -KUB -no pathological bowel obstruction  -Difficult to quantify, patient reporting significant pain at the site  -MSK US: 2.7 x 1.9 x 2.4 cm. Nonspecific:small abscess or hematoma, or potentially cystic lesion   PLAN  Appears stable, no indication for advanced imaging at this time  Bowel regimen  Hot packs, lidocaine to the affected area  Trial of Toradol x5 doses  7/13: Trial of low dose ultram x1 this PM to eval increased pain/ability to sleep     Hypothyroidism  -6/18: TSH, 74.1  PLAN  Synthroid 100 mcg daily  Follow-up TSH in 6 weeks     Hyponatremia  - likely from hypovolemia, poor solute intake  - s/p IVF  - monitor BMP     Hypokalemia  - replace and monitor     Acute urinary retention  - Flomax        Failure to thrive  start  megace      Hard of hearing     Essential hypertension  - Losartan     PUD                DVT Prophylaxis  enoxaparin - 40 MG/0.4ML     Code Status    Code Status: Do Not Resuscitate    Diet  Dietary Orders (From admission, onward)       Start     Ordered    07/07/24 1101  Nutrition Communication  CONTINUOUS        Question:  Nutrition communication (specify)  Answer:  Grilled cheese or hamburger for lunch, thanks!    07/07/24 1101    07/01/24 1031  IDDSI 6 Soft & Bite-Sized (Dysphagia) Diet  DIET EFFECTIVE NOW        Question:  Diet Modifiers  Answer:  IDDSI 6 Soft & Bite-Sized (Dysphagia)    07/01/24 1030    06/19/24 1641  Nutrition Communication  CONTINUOUS        Question:  Nutrition communication (specify)  Answer:  please send tray to [id number], pt no longer in ED,thanks    06/19/24 1640    06/18/24 1403  One Time Diet Regular  DIET EFFECTIVE NOW ONE TIME        Question:  Diet Modifiers  Answer:  Regular    06/18/24 1402                 24  [Normal Sinus Rhythm] : normal sinus rhythm hrs    Communication :  The plan was discussed with patient who expressed clear verbal understanding and agreement. All questions answered.    Time  MORE than 50 MINS WERE SPENT ON THIS PATIENTS CARE TODAY. This includes the following: Reviewed all vitals, medications, new orders, I/O, labs, micro, radiology, nurses notes, pertinent consultant notes which are reflected in assessment and plan.This does not include time spent on other items of care such as smoking cessation counseling, prolonged care time, and or advanced care planning if applicable.   (Level 1 PN: 25 min, Level 2 PN: 35 min, Level 3 PN: 50 min)     Primary Care Physician  Radha Ho PA-C    Expected Discharge Date: TBD     Suze Aguilera MD  [facility] Hospitalist  7/16/2024 9:25 AM           [Normal S1, S2] : normal S1 and S2 [Examined in the supine and seated position] : examined in the supine and seated position [Symmetrical] : symmetrical [No Nipple Retraction] : no left nipple retraction [No Nipple Discharge] : no left nipple discharge [] : multiple nodules  [No Axillary Lymphadenopathy] : no left axillary lymphadenopathy [Soft] : abdomen soft [Normal Bowel Sounds] : normal bowel sounds  [No Edema] : no edema [No Rashes] : no rashes [No Ulceration] : no ulceration

## 2024-08-08 ENCOUNTER — APPOINTMENT (OUTPATIENT)
Dept: INTERNAL MEDICINE | Facility: CLINIC | Age: 24
End: 2024-08-08

## 2024-08-08 PROBLEM — F32.A DEPRESSIVE DISORDER: Status: ACTIVE | Noted: 2024-08-08

## 2024-08-08 PROBLEM — J30.1 ALLERGIC RHINITIS DUE TO POLLEN, UNSPECIFIED SEASONALITY: Status: ACTIVE | Noted: 2024-08-08

## 2024-08-08 PROCEDURE — G2211 COMPLEX E/M VISIT ADD ON: CPT | Mod: NC

## 2024-08-08 PROCEDURE — 99395 PREV VISIT EST AGE 18-39: CPT

## 2024-08-08 PROCEDURE — 99213 OFFICE O/P EST LOW 20 MIN: CPT | Mod: 25

## 2024-08-08 NOTE — PHYSICAL EXAM
[No Acute Distress] : no acute distress [Well Nourished] : well nourished [Well Developed] : well developed [Well-Appearing] : well-appearing [Normal Sclera/Conjunctiva] : normal sclera/conjunctiva [PERRL] : pupils equal round and reactive to light [EOMI] : extraocular movements intact [Normal Outer Ear/Nose] : the outer ears and nose were normal in appearance [No JVD] : no jugular venous distention [No Lymphadenopathy] : no lymphadenopathy [Supple] : supple [Thyroid Normal, No Nodules] : the thyroid was normal and there were no nodules present [No Respiratory Distress] : no respiratory distress  [No Accessory Muscle Use] : no accessory muscle use [Clear to Auscultation] : lungs were clear to auscultation bilaterally [Normal Rate] : normal rate  [Regular Rhythm] : with a regular rhythm [Normal S1, S2] : normal S1 and S2 [No Murmur] : no murmur heard [No Edema] : there was no peripheral edema [No Extremity Clubbing/Cyanosis] : no extremity clubbing/cyanosis [Soft] : abdomen soft [Non Tender] : non-tender [Non-distended] : non-distended [No Masses] : no abdominal mass palpated [Normal Anterior Cervical Nodes] : no anterior cervical lymphadenopathy [Grossly Normal Strength/Tone] : grossly normal strength/tone [No Rash] : no rash [Coordination Grossly Intact] : coordination grossly intact [Normal Gait] : normal gait [Normal Affect] : the affect was normal [Alert and Oriented x3] : oriented to person, place, and time [Normal Insight/Judgement] : insight and judgment were intact [Normal Mood] : the mood was normal [de-identified] : boggy swollen nasal mucosa [de-identified] : refer to breast clinic

## 2024-08-08 NOTE — HISTORY OF PRESENT ILLNESS
[FreeTextEntry1] : CPE [de-identified] : pt has been doing well without any acute illness or injury. pt states she has been depressed for the past 9 years, and now seeing therapist who is helping her a lot. pt is seeking to see a psychiatrist as well.

## 2024-08-08 NOTE — PLAN
[FreeTextEntry1] :  HCM )  follow w/ dental, ophthalmology/optometry as recommended. GYN routine evaluation is due between now -27 years old,  STD screening once becomes sexually active healthy and safe life style discussed with pt  High TG under fair control, con't low fat cholesterol diet, fish oil supplement daily Depression, follow with psychotherapy, pt to see psychiatry for pharmacological treatment' depression treatment with therapy medication, discussed with pt Allergic rhinitis chronic, flonase nasal spray use daily for 1 week, the daily as needed, combine with oral antihistamins  observe closely clinically and RTC if persistent or worsening S/S

## 2024-08-08 NOTE — HEALTH RISK ASSESSMENT
[Yes] : Yes [2 - 4 times a month (2 pts)] : 2-4 times a month (2 points) [1 or 2 (0 pts)] : 1 or 2 (0 points) [No] : In the past 12 months have you used drugs other than those required for medical reasons? No [No falls in past year] : Patient reported no falls in the past year [3] : 1) Little interest or pleasure doing things for nearly every day (3) [1] : 2) Feeling down, depressed, or hopeless for several days (1) [PHQ-2 Positive] : PHQ-2 Positive [Nearly Every Day (3)] : 4.) Feeling tired or having little energy? Nearly every day [1/2 of Days or More (2)] : 6.) Feeling bad about yourself, or that you are a failure, or have let yourself or your family down? Half the days or more [Several Days (1)] : 7.) Trouble concentrating on things, such as reading a newspaper or watching television? Several days [Not at All (0)] : 9.) Thoughts that you would be off dead or of hurting yourself in some way? Not at all [Moderate] : Severity of Depression is Moderate [Somewhat Difficult] : How difficult have these problems made it for you to do your work, take care of things at home, or get along with people? Somewhat difficult [PHQ-9 Positive] : PHQ-9 Positive [I have developed a follow-up plan documented below in the note.] : I have developed a follow-up plan documented below in the note. [Never] : Never [HIV test declined] : HIV test declined [Hepatitis C test declined] : Hepatitis C test declined [None] : None [With Family] : lives with family [Unemployed] : unemployed [Single] : single [Feels Safe at Home] : Feels safe at home [Fully functional (using the telephone, shopping, preparing meals, housekeeping, doing laundry, using] : Fully functional and needs no help or supervision to perform IADLs (using the telephone, shopping, preparing meals, housekeeping, doing laundry, using transportation, managing medications and managing finances) [Reports normal functional visual acuity (ie: able to read med bottle)] : Reports normal functional visual acuity [Seat Belt] :  uses seat belt [Good] : ~his/her~  mood as  good [de-identified] : low fat [de-identified] : pt is following up with psychotherapist which has buddy helping [[id number]] : 4 [[id number]] : 13 [EyeExamDate] : 7/2024 with optometrist [Change in mental status noted] : No change in mental status noted [Language] : denies difficulty with language [Behavior] : denies difficulty with behavior [Sexually Active] : not sexually active [High Risk Behavior] : no high risk behavior [Reports changes in hearing] : Reports no changes in hearing [Reports changes in vision] : Reports no changes in vision [Reports changes in dental health] : Reports no changes in dental health

## 2024-08-08 NOTE — REVIEW OF SYSTEMS
[Negative] : Heme/Lymph [Depression] : depression [Earache] : no earache [Hearing Loss] : no hearing loss [Hoarseness] : no hoarseness [Nasal Discharge] : no nasal discharge [Sore Throat] : no sore throat [Postnasal Drip] : no postnasal drip [Suicidal] : not suicidal [Anxiety] : no anxiety [FreeTextEntry4] : chronic nasal congestion due to allergies, worse during season change, snoring, breathing via mouth a lot, never treated.

## 2024-08-22 ENCOUNTER — TRANSCRIPTION ENCOUNTER (OUTPATIENT)
Age: 24
End: 2024-08-22

## 2024-08-22 VITALS
DIASTOLIC BLOOD PRESSURE: 87 MMHG | RESPIRATION RATE: 18 BRPM | HEIGHT: 64 IN | WEIGHT: 119.93 LBS | HEART RATE: 64 BPM | SYSTOLIC BLOOD PRESSURE: 112 MMHG | TEMPERATURE: 98 F | OXYGEN SATURATION: 99 %

## 2024-08-23 ENCOUNTER — OUTPATIENT (OUTPATIENT)
Dept: OUTPATIENT SERVICES | Facility: HOSPITAL | Age: 24
LOS: 1 days | Discharge: HOME | End: 2024-08-23
Payer: COMMERCIAL

## 2024-08-23 ENCOUNTER — APPOINTMENT (OUTPATIENT)
Dept: SURGICAL ONCOLOGY | Facility: AMBULATORY SURGERY CENTER | Age: 24
End: 2024-08-23

## 2024-08-23 ENCOUNTER — TRANSCRIPTION ENCOUNTER (OUTPATIENT)
Age: 24
End: 2024-08-23

## 2024-08-23 VITALS
TEMPERATURE: 98 F | SYSTOLIC BLOOD PRESSURE: 102 MMHG | HEART RATE: 69 BPM | RESPIRATION RATE: 18 BRPM | OXYGEN SATURATION: 100 % | DIASTOLIC BLOOD PRESSURE: 81 MMHG

## 2024-08-23 DIAGNOSIS — Z98.890 OTHER SPECIFIED POSTPROCEDURAL STATES: Chronic | ICD-10-CM

## 2024-08-23 DIAGNOSIS — N63.20 UNSPECIFIED LUMP IN THE LEFT BREAST, UNSPECIFIED QUADRANT: ICD-10-CM

## 2024-08-23 PROCEDURE — 19120 REMOVAL OF BREAST LESION: CPT | Mod: LT

## 2024-08-23 PROCEDURE — 88307 TISSUE EXAM BY PATHOLOGIST: CPT | Mod: 26

## 2024-08-23 NOTE — ASU DISCHARGE PLAN (ADULT/PEDIATRIC) - CARE PROVIDER_API CALL
Katie Messina)  Surgery  [address] [place] [id number]  Phone: [phone]  Fax: [phone]  Follow Up Time:   
unknown

## 2024-08-23 NOTE — ASU PREOP CHECKLIST - DNR CLARIFICATION FORM COMPLETED
Clinic Visit Summary    2017      DOLORES BANDA Description:  Female :  1964   MRN: [id number] Provider:  Obey Priest M.D.    Primary Physician:  Maria Alejandra Rudolph MD  Referring Physician:  Pradeep Julian MD     Insurance Information  [employer] [id number]     Reason for Visit  Follow Up Appointment         Health History  Malignant neoplasm of endometrium [ICD10] C54.1    Vitals (last recorded)  BP Pulse Height Weight Temp   149/84  61  63.39in (161cm)  184.64lb  (83.75kg) 36.7ºc     Allergies (as reported by patient)  Allergy Type Name Reaction     No Known Drug Allergies        Current Medications (as reported by patient)  Drug Name Dose Strength Route   Synthroid 112 Microgram   Oral   lorazepam 0.5 mg   Oral   metoprolol succinate 1 tab   Oral   simvastatin 10 mg   Oral        Medications administered today  Drug Name Ordering Dose Actual Dose     Medications prescribed today  Date Description Ordering Physician      Orders  Date Description Ordering Physician     Next Visit Information    Appointment Date Appointment Time Activity   2017 3:00 PM MD Prep 30   2017 3:30 PM F/U 30 Minutes            Special instructions          Reminders  · If you did not schedule your follow up appointment at the time of your visit, please call the department at the number above.  · Please provide a copy of this summary of care to your next provider.    
n/a

## 2024-08-23 NOTE — ASU DISCHARGE PLAN (ADULT/PEDIATRIC) - ASU DC SPECIAL INSTRUCTIONSFT
[facility]     Department of Surgery  Division of Surgical Oncology      Breast Biopsy/Lumpectomy Post-operative Instructions  1.	Supportive bra- Please bring a sports/athletic bra with you on the day of your surgery.  You will wear it home from the hospital.  You should wear the supportive bra continuously for 48 hours after the procedure, including wearing it to sleep.  Thereafter, you may wear your regular bra.  You may remove the bra to shower.  The sports bra will provide support, decrease the amount of swelling at the biopsy site, and make your recovery more comfortable.    2.	Wound dressing- There is a dressing on the wound, which consists of 2 layers.  The outer layer is a clear plastic dressing (called Tegaderm) which is waterproof.  This should remain in place for 2 days post-operatively.  On the 2nd post-operative day, you should remove the clear plastic Tegaderm dressing.  Underneath the Tegaderm dressing, there are white surgical tapes (called steri strips) which are directly adherent to the skin.  These should remain on the skin, and will peel off naturally.  All of the stitches are “internal” and will dissolve naturally.    3.	Showering/Bathing- You may shower over the dressing the very next day after surgery.  Allow the water to run over the dressing, but don not scrub the area.  It is best not to sit in a bathtub or swimming pool for at least one week after surgery.    4.	Activity level- You may resume most normal daily activity as tolerated, but avoid strenuous activities such as aerobics, jogging, exercising or heavy lifting for at least 1 week after surgery.  You may return to work in 1-2 days after surgery.  You may drive as long as you are not taking any prescription pain medication.    5.	Pain Medication- You may take extra-strength Tylenol, Motrin, or Advil as needed for pain control.    6.	Follow-up Appointment- Please call the office to schedule your post-operative appointment which should be approximately 10 days after your surgery. Office [phone]    7.	Bruising/Bleeding/Swelling- It is normal for there to be some bruising and swelling at the breast biopsy site, and there may be some staining of blood on to the dressing.  Some discomfort at the surgical site is expected.  If your symptoms seem excessive, or if you have any question or concerns, please call the office.      Anesthesia Precautions:  For the next 12 hours do not:   •	drive a car,  •	drink alcohol, beer, or wine,   •	make important personal or business decisions  Diet:   •	Progress diet slowly unless otherwise indicated    Physician Notification  •	Any Prescription issues  •	Bleeding that does not stop  •	Persistent nausea and vomiting  •	Pain not relieved by medications  •	Fever greater than 101®F  •	Inability to tolerate liquids or foods  •	Unable to urinate after 8 hours  Discharge and Disposition  •	Discharge to home/ group home/assisted living  •	Accompanied by Family/Spouse/ Parents/ Significant Other/ Friend/ and or Caregiver    Follow Up Care:  •	In the event that you develop a complication and you are unable to reach your own physician, you may contact:  [phone] or go to the nearest Emergency Room.   •	Please call your surgeon to schedule your follow up appointment [phone]

## 2024-08-23 NOTE — ASU DISCHARGE PLAN (ADULT/PEDIATRIC) - NS MD DC FALL RISK RISK
For information on Fall & Injury Prevention, visit: [email or web address] OR  [email or web address] OR  https://www.cdc.gov/steadi/patient.html

## 2024-08-28 LAB — SURGICAL PATHOLOGY STUDY: SIGNIFICANT CHANGE UP

## 2025-01-08 ENCOUNTER — APPOINTMENT (OUTPATIENT)
Dept: SURGICAL ONCOLOGY | Facility: CLINIC | Age: 25
End: 2025-01-08
Payer: COMMERCIAL

## 2025-01-08 DIAGNOSIS — N63.20 UNSPECIFIED LUMP IN THE RIGHT BREAST, UNSPECIFIED QUADRANT: ICD-10-CM

## 2025-01-08 DIAGNOSIS — N63.10 UNSPECIFIED LUMP IN THE RIGHT BREAST, UNSPECIFIED QUADRANT: ICD-10-CM

## 2025-01-08 PROCEDURE — 99214 OFFICE O/P EST MOD 30 MIN: CPT

## 2025-01-08 RX ORDER — FLUOXETINE HYDROCHLORIDE 40 MG/1
CAPSULE ORAL
Refills: 0 | Status: ACTIVE | COMMUNITY

## 2025-01-08 RX ORDER — CLONAZEPAM 2 MG/1
TABLET ORAL
Refills: 0 | Status: ACTIVE | COMMUNITY

## (undated) DEVICE — SUT SILK 2-0 30" SH

## (undated) DEVICE — DRAPE 3/4 SHEET 52X76"

## (undated) DEVICE — RADIOGRAPHY DVC SPEC TRANSPEC

## (undated) DEVICE — DRAPE TOWEL BLUE 17" X 24"

## (undated) DEVICE — ELCTR GROUNDING PAD ADULT COVIDIEN

## (undated) DEVICE — DRSG MAMMARY SUPPORT XL SIZE 5

## (undated) DEVICE — DRSG TEGADERM 4X4.75"

## (undated) DEVICE — POSITIONER FOAM EGG CRATE ULNAR 2PCS (PINK)

## (undated) DEVICE — DRAPE INSTRUMENT POUCH 6.75" X 11"

## (undated) DEVICE — PACK MINOR NO DRAPE

## (undated) DEVICE — GOWN LG

## (undated) DEVICE — DRAPE LAPAROTOMY TRANSVERSE

## (undated) DEVICE — GLV 6.5 PROTEXIS (WHITE)

## (undated) DEVICE — SUT MONOCRYL 4-0 27" PS-2 UNDYED

## (undated) DEVICE — ELCTR ROCKER SWITCH PENCIL BLUE 10FT

## (undated) DEVICE — ELCTR BOVIE TIP BLADE INSULATED 4" EDGE

## (undated) DEVICE — SOL IRR POUR H2O 500ML

## (undated) DEVICE — DRSG MAMMARY SUPPORT MED SIZE 3

## (undated) DEVICE — DRSG MAMMARY SUPPORT LG SIZE 4

## (undated) DEVICE — NDL HYPO SAFE 25G X 1" (ORANGE)

## (undated) DEVICE — DRSG MAMMARY SUPPORT MED SIZE 2

## (undated) DEVICE — DRSG TELFA 3 X 8

## (undated) DEVICE — VENODYNE/SCD SLEEVE CALF MEDIUM

## (undated) DEVICE — CAM-ESU 1501367: Type: DURABLE MEDICAL EQUIPMENT

## (undated) DEVICE — SOL IRR POUR NS 0.9% 500ML

## (undated) DEVICE — LAP PAD W RING 18 X 18"

## (undated) DEVICE — DRSG STERISTRIPS 0.5 X 4"

## (undated) DEVICE — WARMING BLANKET LOWER ADULT

## (undated) DEVICE — SUT VICRYL 3-0 27" SH UNDYED

## (undated) DEVICE — POSITIONER PATIENT SAFETY STRAP 3X60"

## (undated) DEVICE — GLV 6 PROTEXIS (WHITE)

## (undated) DEVICE — DRSG MAMMARY SUPPORT SM SIZE 1